# Patient Record
(demographics unavailable — no encounter records)

---

## 2024-10-27 NOTE — PHYSICAL EXAM
[Normal] : normal rate, regular rhythm, normal S1 and S2 and no murmur heard [No Edema] : there was no peripheral edema [de-identified] : BP mildly elevated   Afebrile   No tachycardia or tachypea noted.  Pulse ox 99% [de-identified] : +Nasal congestion, sinus congestion

## 2024-10-27 NOTE — HISTORY OF PRESENT ILLNESS
[FreeTextEntry8] : 72 yrs old female here with history of COPD, hypothyroidism, hyperlipidemia, mild MR, allergic rhinitis, here for acute visit for: Post nasal drip, cold symptoms, congestion- clear mucous. No known fever, occasional chills Cough is productive clear phlegm, whitish, tinge of yellow Chest pain when coughing Hx COPD- stable on medication. Taking Flonase, Astelazine nasal spray, Mucinex. Spiriva for COPD 2 puffs QD. No other med changes. Lasting 1 week COVID neg,  Flu neg today Multiple allergies to antibiotics- Bactrim, Clindamycin, Penicillin, Avelox, Biaxin.  Hx smoking. No dyspnea, wheezing, diarrhea, BPR, dysuria, headache or dizziness.

## 2024-10-27 NOTE — PHYSICAL EXAM
[Normal] : normal rate, regular rhythm, normal S1 and S2 and no murmur heard [No Edema] : there was no peripheral edema [de-identified] : BP mildly elevated   Afebrile   No tachycardia or tachypea noted.  Pulse ox 99% [de-identified] : +Nasal congestion, sinus congestion

## 2024-10-27 NOTE — PHYSICAL EXAM
[Normal] : normal rate, regular rhythm, normal S1 and S2 and no murmur heard [No Edema] : there was no peripheral edema [de-identified] : BP mildly elevated   Afebrile   No tachycardia or tachypea noted.  Pulse ox 99% [de-identified] : +Nasal congestion, sinus congestion

## 2025-01-03 NOTE — HISTORY OF PRESENT ILLNESS
[de-identified] : 72-year-old white female presents for complete checkup patient denies chest pain shortness of breath fever chills abdominal pain she is seeing pulmonary for COPD rheumatology for arthralgias and neurology for brachial plexopathy

## 2025-01-03 NOTE — ASSESSMENT
[FreeTextEntry1] : Diet and exercise Check complete blood work done recently will forward Pap mammogram breast self-exam Bone density colonoscopy ophthalmology vitamin D Depression screen done and reviewed 5 minutes Up-to-date on vaccines COPD follow-up pulmonary Tobacco use suggested low-dose CT for lung cancer screening discussed 5 minutes Mild MR follow-up cardiology to forward recent EKG done by cardiology Hyperlipidemia continue atorvastatin to forward blood work Hypothyroid continue levothyroxine to forward blood work Follow-up 6 months

## 2025-01-03 NOTE — PHYSICAL EXAM
[Normal] : normal rate, regular rhythm, normal S1 and S2 and no murmur heard [No Edema] : there was no peripheral edema [No Acute Distress] : no acute distress [Well Nourished] : well nourished [Well Developed] : well developed [Well-Appearing] : well-appearing [Normal Sclera/Conjunctiva] : normal sclera/conjunctiva [PERRL] : pupils equal round and reactive to light [EOMI] : extraocular movements intact [Normal Outer Ear/Nose] : the outer ears and nose were normal in appearance [Normal Oropharynx] : the oropharynx was normal [No JVD] : no jugular venous distention [No Lymphadenopathy] : no lymphadenopathy [Supple] : supple [Thyroid Normal, No Nodules] : the thyroid was normal and there were no nodules present [No Respiratory Distress] : no respiratory distress  [No Accessory Muscle Use] : no accessory muscle use [Clear to Auscultation] : lungs were clear to auscultation bilaterally [Normal Rate] : normal rate  [Regular Rhythm] : with a regular rhythm [Normal S1, S2] : normal S1 and S2 [No Murmur] : no murmur heard [No Carotid Bruits] : no carotid bruits [No Abdominal Bruit] : a ~M bruit was not heard ~T in the abdomen [No Varicosities] : no varicosities [Pedal Pulses Present] : the pedal pulses are present [No Palpable Aorta] : no palpable aorta [No Extremity Clubbing/Cyanosis] : no extremity clubbing/cyanosis [Normal Appearance] : normal in appearance [No Nipple Discharge] : no nipple discharge [No Axillary Lymphadenopathy] : no axillary lymphadenopathy [Soft] : abdomen soft [Non Tender] : non-tender [Non-distended] : non-distended [No Masses] : no abdominal mass palpated [No HSM] : no HSM [Normal Bowel Sounds] : normal bowel sounds [Normal Posterior Cervical Nodes] : no posterior cervical lymphadenopathy [Normal Anterior Cervical Nodes] : no anterior cervical lymphadenopathy [No CVA Tenderness] : no CVA  tenderness [No Spinal Tenderness] : no spinal tenderness [No Joint Swelling] : no joint swelling [Grossly Normal Strength/Tone] : grossly normal strength/tone [No Rash] : no rash [Coordination Grossly Intact] : coordination grossly intact [No Focal Deficits] : no focal deficits [Normal Gait] : normal gait [Normal Affect] : the affect was normal [Normal Insight/Judgement] : insight and judgment were intact [de-identified] : BP mildly elevated   Afebrile   No tachycardia or tachypea noted.  Pulse ox 99%

## 2025-01-03 NOTE — HEALTH RISK ASSESSMENT
[Yes] : Yes [4 or more  times a week (4 pts)] : 4 or more  times a week (4 points) [Never (0 pts)] : Never (0 points) [No falls in past year] : Patient reported no falls in the past year [0] : 2) Feeling down, depressed, or hopeless: Not at all (0) [PHQ-2 Negative - No further assessment needed] : PHQ-2 Negative - No further assessment needed [Time Spent: ___ Minutes] : I spent [unfilled] minutes performing a depression screening for this patient. 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

## 2025-01-03 NOTE — COUNSELING
[Fall prevention counseling provided] : Fall prevention counseling provided [Encouraged to increase physical activity] : Encouraged to increase physical activity [Cessation strategies including cessation program discussed] : Cessation strategies including cessation program discussed [Use of nicotine replacement therapies and other medications discussed] : Use of nicotine replacement therapies and other medications discussed [Yes] : Willing to quit smoking [ - Annual Lung Cancer Screening/Share Decision Making Discussion] : Annual Lung Cancer Screening/Share Decision Making Discussion. (I have advised this patient to have a Low Dose CT (LDCT) scan of the lungs and have discussed the following with the patient in a shared decision making discussion:   Benefits of Detection and Early Treatment: There is adequate evidence that annual screening for lung cancer with LDCT in a population of high-risk persons can prevent a substantial number of lung cancer-related deaths. The magnitude of benefit depends on the individual patient's risk for lung cancer, as those who are at highest risk are most likely to benefit. Screening cannot prevent most lung cancer-related deaths, and does not replace smoking cessation. Harms of Detection and Early Intervention and Treatment: The harms associated with LDCT screening include false-negative and false-positive results, incidental findings, over diagnosis, and radiation exposure. False-positive LDCT results occur in a substantial proportion of screened persons; 95% of all positive results do not lead to a diagnosis of cancer. In a high-quality screening program, further imaging can resolve most false-positive results; however, some patients may require invasive procedures. Radiation harms, including cancer resulting from cumulative exposure to radiation, vary depending on the age at the start of screening; the number of scans received; and the person's exposure to other sources of radiation, particularly other medical imaging.) [FreeTextEntry1] : 5

## 2025-03-29 NOTE — PLAN
[FreeTextEntry1] : 73 yrs old female here for acute visit. Patient is having vaginal discomfort and frequency. States drinking a lot of fluids but concerned about UTI. Patient has history of UTIs, COPD, hypothyroidism, mild MR, microscopic hematuria and allergic rhinitis. U/A today showed trace blood Patient's exam shows no significant abdominal or pelvic tenderness, mildly elevated BP, afebrile. Patient is in NAD Chronic LLL crackles due COPD- no cardiac or respiratory symptoms. Afebrile and in no acute distress IMP/PLAN: Microscopic hematuria, frequency and vaginal discomfort Rule out UTI Plan: Increase PO fluids Send out urine for culture and microscopic Will contact patient with results and start appropriate antibiotics if indicated. Increase PO fluids, cranberry, watch for fever or worsening RV 1 week or sooner if not improved or worsening. Patient understands instructions. Pulmonary followup  for COPD

## 2025-03-29 NOTE — PHYSICAL EXAM
[Soft] : abdomen soft [Non Tender] : non-tender [Non-distended] : non-distended [Coordination Grossly Intact] : coordination grossly intact [No Focal Deficits] : no focal deficits [Normal Gait] : normal gait [Normal] : affect was normal and insight and judgment were intact [de-identified] : BP elevated today 165/85  BMI: 22.92  Afebrile   [de-identified] : Chronic LLL crackles

## 2025-03-29 NOTE — PHYSICAL EXAM
[Soft] : abdomen soft [Non Tender] : non-tender [Non-distended] : non-distended [Coordination Grossly Intact] : coordination grossly intact [No Focal Deficits] : no focal deficits [Normal Gait] : normal gait [Normal] : affect was normal and insight and judgment were intact [de-identified] : BP elevated today 165/85  BMI: 22.92  Afebrile   [de-identified] : Chronic LLL crackles

## 2025-03-29 NOTE — HISTORY OF PRESENT ILLNESS
[FreeTextEntry8] : 73 yrs old female history of COPD, hypothyroidism, hyperlipidemia, mild MR, prior UTIs, allergic rhinitis, here for acute visit.  Patient has had symptoms of soreness vaginal region and frequency. No vaginal discharge. Denies abdominal pain and pelvic pain. Denies fever, chills, dysuria or urgency.  Multiple allergies to antibiotics- Bactrim, Clindamycin, Penicillin, Avelox, Biaxin. Patient states has taken Zpack many times- without side effects Medication: No new medication Hx smoking No dyspnea, wheezing, diarrhea, BPR, dysuria, headache or dizziness.

## 2025-04-14 NOTE — ADDENDUM
[FreeTextEntry1] : This note was partly authored by Alyse Rosario working as a scribe for RAJ Genao.   I, RAJ Genao, have reviewed this note and confirm it is true and accurate. I personally performed the history and physical examination and made all the decisions. 04/14/2025

## 2025-04-14 NOTE — PHYSICAL EXAM
[No Acute Distress] : no acute distress [Well Nourished] : well nourished [Well Developed] : well developed [Well-Appearing] : well-appearing [Normal Sclera/Conjunctiva] : normal sclera/conjunctiva [PERRL] : pupils equal round and reactive to light [EOMI] : extraocular movements intact [Normal Outer Ear/Nose] : the outer ears and nose were normal in appearance [No JVD] : no jugular venous distention [No Lymphadenopathy] : no lymphadenopathy [Supple] : supple [Thyroid Normal, No Nodules] : the thyroid was normal and there were no nodules present [No Respiratory Distress] : no respiratory distress  [No Accessory Muscle Use] : no accessory muscle use [Clear to Auscultation] : lungs were clear to auscultation bilaterally [Normal Rate] : normal rate  [Regular Rhythm] : with a regular rhythm [Normal S1, S2] : normal S1 and S2 [No Murmur] : no murmur heard [Normal] : normal rate, regular rhythm, normal S1 and S2 and no murmur heard [No Carotid Bruits] : no carotid bruits [No Varicosities] : no varicosities [No Edema] : there was no peripheral edema [No Extremity Clubbing/Cyanosis] : no extremity clubbing/cyanosis [Normal Appearance] : normal in appearance [No Nipple Discharge] : no nipple discharge [No Axillary Lymphadenopathy] : no axillary lymphadenopathy [Soft] : abdomen soft [Non Tender] : non-tender [Non-distended] : non-distended [No Masses] : no abdominal mass palpated [No HSM] : no HSM [Normal Bowel Sounds] : normal bowel sounds [Normal Posterior Cervical Nodes] : no posterior cervical lymphadenopathy [Normal Anterior Cervical Nodes] : no anterior cervical lymphadenopathy [No CVA Tenderness] : no CVA  tenderness [No Spinal Tenderness] : no spinal tenderness [No Joint Swelling] : no joint swelling [Grossly Normal Strength/Tone] : grossly normal strength/tone [No Rash] : no rash [Coordination Grossly Intact] : coordination grossly intact [No Focal Deficits] : no focal deficits [Normal Gait] : normal gait [Normal Affect] : the affect was normal [Normal Insight/Judgement] : insight and judgment were intact [de-identified] : BP mildly elevated   Afebrile   No tachycardia or tachypea noted.  Pulse ox 99% [de-identified] : Pharynx injected no exudate

## 2025-04-14 NOTE — HISTORY OF PRESENT ILLNESS
[FreeTextEntry8] : 73-year-old white female presents complaining of cough hip pain and diarrhea patient denies chest pain shortness of breath fever chills abdominal pain she spoke to her pulmonologist and was given a Z-Abel with minimal relief

## 2025-04-14 NOTE — ASSESSMENT
[FreeTextEntry1] : Increase fluids Tylenol rest yes Acute cough rapid COVID positive Symptoms present for over a week will hold off on Paxlovid due to length of symptoms Robitussin DM Flonase supportive care Will call back if symptoms persist Fibromyalgia follow-up rheumatology Herniated disc follow-up orthopedics acute hip pain may be secondary to inflammation due to COVID Rapid flu negative Follow-up CCU will call back if symptoms persist

## 2025-04-14 NOTE — HEALTH RISK ASSESSMENT
[Yes] : Yes [4 or more  times a week (4 pts)] : 4 or more  times a week (4 points) [Never (0 pts)] : Never (0 points) [No falls in past year] : Patient reported no falls in the past year [0] : 2) Feeling down, depressed, or hopeless: Not at all (0) [PHQ-2 Negative - No further assessment needed] : PHQ-2 Negative - No further assessment needed [Time Spent: ___ Minutes] : I spent [unfilled] minutes performing a depression screening for this patient. [Current] : Current [20 or more] : 20 or more [NO] : No [Hepatitis C test offered] : Hepatitis C test offered [Alone] : lives alone [Employed] : employed [Fully functional (bathing, dressing, toileting, transferring, walking, feeding)] : Fully functional (bathing, dressing, toileting, transferring, walking, feeding) [Fully functional (using the telephone, shopping, preparing meals, housekeeping, doing laundry, using] : Fully functional and needs no help or supervision to perform IADLs (using the telephone, shopping, preparing meals, housekeeping, doing laundry, using transportation, managing medications and managing finances) [[id number]] : 0 [de-identified] : social [Change in mental status noted] : No change in mental status noted [MammogramDate] : 2024 [PapSmearDate] : 2024 [BoneDensityDate] : 2022 [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

## 2025-04-14 NOTE — COUNSELING
[Fall prevention counseling provided] : Fall prevention counseling provided [Cessation strategies including cessation program discussed] : Cessation strategies including cessation program discussed [Use of nicotine replacement therapies and other medications discussed] : Use of nicotine replacement therapies and other medications discussed [Yes] : Willing to quit smoking [Encouraged to increase physical activity] : Encouraged to increase physical activity [FreeTextEntry1] : 5

## 2025-07-09 NOTE — HEALTH RISK ASSESSMENT
[Yes] : Yes [4 or more  times a week (4 pts)] : 4 or more  times a week (4 points) [Never (0 pts)] : Never (0 points) [No falls in past year] : Patient reported no falls in the past year [0] : 2) Feeling down, depressed, or hopeless: Not at all (0) [PHQ-2 Negative - No further assessment needed] : PHQ-2 Negative - No further assessment needed [Time Spent: ___ Minutes] : I spent [unfilled] minutes performing a depression screening for this patient. [Current] : Current [20 or more] : 20 or more [NO] : No [Hepatitis C test offered] : Hepatitis C test offered [Alone] : lives alone [Employed] : employed [Fully functional (bathing, dressing, toileting, transferring, walking, feeding)] : Fully functional (bathing, dressing, toileting, transferring, walking, feeding) [Fully functional (using the telephone, shopping, preparing meals, housekeeping, doing laundry, using] : Fully functional and needs no help or supervision to perform IADLs (using the telephone, shopping, preparing meals, housekeeping, doing laundry, using transportation, managing medications and managing finances) [[id number]] : 0 [de-identified] : social [Change in mental status noted] : No change in mental status noted [MammogramDate] : 2024 [PapSmearDate] : 2024 [BoneDensityDate] : 2022 [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

## 2025-07-09 NOTE — HISTORY OF PRESENT ILLNESS
[FreeTextEntry8] : 73-year-old white female presents for evaluation of a facial trauma patient tripped outside by her home and hit her face on the ground and her left arm and left buttock she complains of tenderness in her cheek on the left side she denies chest pain shortness of breath dizziness palpitations syncope fever chills GI symptoms headache earache or change in vision

## 2025-07-09 NOTE — ASSESSMENT
[FreeTextEntry1] : Facial trauma status post trip and fall Refer for x-rays of the left cheek and nose to rule out fracture Ice Tylenol as needed Will call back if symptoms persist refer to ENT if symptoms persist Follow-up after x-rays obtained

## 2025-07-09 NOTE — PHYSICAL EXAM
[No Acute Distress] : no acute distress [Well Nourished] : well nourished [Well Developed] : well developed [Well-Appearing] : well-appearing [Normal Sclera/Conjunctiva] : normal sclera/conjunctiva [PERRL] : pupils equal round and reactive to light [EOMI] : extraocular movements intact [Normal Outer Ear/Nose] : the outer ears and nose were normal in appearance [No JVD] : no jugular venous distention [No Lymphadenopathy] : no lymphadenopathy [Supple] : supple [Thyroid Normal, No Nodules] : the thyroid was normal and there were no nodules present [No Respiratory Distress] : no respiratory distress  [No Accessory Muscle Use] : no accessory muscle use [Clear to Auscultation] : lungs were clear to auscultation bilaterally [Normal Rate] : normal rate  [Regular Rhythm] : with a regular rhythm [Normal S1, S2] : normal S1 and S2 [No Murmur] : no murmur heard [Normal] : normal rate, regular rhythm, normal S1 and S2 and no murmur heard [No Carotid Bruits] : no carotid bruits [No Varicosities] : no varicosities [No Edema] : there was no peripheral edema [No Extremity Clubbing/Cyanosis] : no extremity clubbing/cyanosis [Normal Appearance] : normal in appearance [Soft] : abdomen soft [Non Tender] : non-tender [Non-distended] : non-distended [No Masses] : no abdominal mass palpated [No HSM] : no HSM [Normal Bowel Sounds] : normal bowel sounds [Normal Posterior Cervical Nodes] : no posterior cervical lymphadenopathy [Normal Anterior Cervical Nodes] : no anterior cervical lymphadenopathy [No CVA Tenderness] : no CVA  tenderness [No Spinal Tenderness] : no spinal tenderness [No Joint Swelling] : no joint swelling [Grossly Normal Strength/Tone] : grossly normal strength/tone [No Rash] : no rash [Coordination Grossly Intact] : coordination grossly intact [No Focal Deficits] : no focal deficits [Normal Gait] : normal gait [Normal Affect] : the affect was normal [Normal Insight/Judgement] : insight and judgment were intact [de-identified] : BP mildly elevated   Afebrile   No tachycardia or tachypea noted.  Pulse ox 99% [de-identified] : facial ecchymosis left  tender suborbital left